# Patient Record
Sex: FEMALE | Race: WHITE
[De-identification: names, ages, dates, MRNs, and addresses within clinical notes are randomized per-mention and may not be internally consistent; named-entity substitution may affect disease eponyms.]

---

## 2020-03-23 ENCOUNTER — HOSPITAL ENCOUNTER (OUTPATIENT)
Dept: HOSPITAL 96 - M.RAD | Age: 55
End: 2020-03-23
Attending: ORTHOPAEDIC SURGERY
Payer: COMMERCIAL

## 2020-03-23 DIAGNOSIS — M25.561: Primary | ICD-10-CM

## 2020-03-27 ENCOUNTER — HOSPITAL ENCOUNTER (OUTPATIENT)
Dept: HOSPITAL 96 - M.MRI | Age: 55
End: 2020-03-27
Attending: ORTHOPAEDIC SURGERY
Payer: COMMERCIAL
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
## 2020-03-27 DIAGNOSIS — S83.281A: Primary | ICD-10-CM

## 2020-04-23 ENCOUNTER — HOSPITAL ENCOUNTER (OUTPATIENT)
Dept: HOSPITAL 96 - M.SUR | Age: 55
Discharge: HOME | End: 2020-04-23
Attending: ORTHOPAEDIC SURGERY
Payer: COMMERCIAL
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
## 2020-04-23 DIAGNOSIS — S83.511A: Primary | ICD-10-CM

## 2020-04-23 DIAGNOSIS — Z79.899: ICD-10-CM

## 2020-04-23 DIAGNOSIS — M94.261: ICD-10-CM
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
## 2020-04-23 DIAGNOSIS — S83.241A: ICD-10-CM

## 2020-04-23 DIAGNOSIS — Z98.890: ICD-10-CM

## 2020-05-04 NOTE — OP
[address]  [address]                    OPERATIVE REPORT              
_______________________________________________________________________________
 
Name:       AIMEE VICKERS                 Room:                      [facility]#:  [id number]      Account #:      [id number]  
Admission:  04/23/20     Attend Phys:    Chau Box II
Discharge:               Date of Birth:  [birth year]  
         Report #: [id number]
                                                                     [id number]  
_______________________________________________________________________________
THIS REPORT FOR:  //name//                      
 
cc:  Mirlande Gamez RN, Janene RN FNP                                             ~
THIS REPORT FOR:  //name//                      
 
CC: Mirlande Box
 
DATE OF SERVICE:  04/23/2020
 
 
PREOPERATIVE DIAGNOSIS:  Right knee anterior cruciate ligament tear.
 
POSTOPERATIVE DIAGNOSES:
1.  Right knee anterior cruciate ligament tear.
2.  Medial meniscus tear.
3.  Grade 3 chondromalacia, lateral femoral condyle.
4.  Grade 3 chondromalacia, medial femoral condyle.
 
PROCEDURE PERFORMED:
1.  Right knee arthroscopic surgery with ACL reconstruction with allograft.
2.  Abrasion chondroplasty of lateral femoral condyle down to bleeding bone.
3.  Partial medial meniscectomy.
4.  Abrasion chondroplasty of medial femoral condyle down to bleeding bone.
 
SURGEON:  Chau Box II, DO.
 
ASSISTANT:  JOE Hatch.
 
ANESTHESIA:  Per operative record.
 
ESTIMATED BLOOD LOSS:  Minimal.
 
ANTIBIOTICS:  Per operative record.
 
DRAINS:  None.
 
COMPLICATIONS:  None.
 
CONDITION OF THE PATIENT:  Stable to recovery room.
 
DESCRIPTION OF PROCEDURE:  The patient was taken to the operative suite and
placed supine on the operating table, given appropriate anesthesia.  The
patient's affected lower extremity was sterilely prepped and draped in a
well-padded knee arthroscopic koenig.  Surgery began by [address]  [address]                    OPERATIVE REPORT              
_______________________________________________________________________________
 
Name:       AIMEE VICKERS                 Room:                      [facility].#:  [id number]      Account #:      [id number]  
Admission:  04/23/20     Attend Phys:    Chau Box II
Discharge:               Date of Birth:  [birth year]  
         Report #: [id number]
                                                                     [id number]  
_______________________________________________________________________________
portal incisions.  The arthroscope was advanced in the joint.  There was shown
to be grade 3 chondromalacia to the lateral femoral condyle.  Utilizing a
shaver, an abrasion chondroplasty was performed down to bleeding bone and then
smoothed using Coblation wand in appropriate fashion.  The medial meniscus was
shown to have a tear along its medial margin to the posterior horn.  This was
debrided utilizing using baskets and shaver to resect the torn flap of tissue
from within the joint and then smoothed using Coblation wand.  The PCL was
intact.  The ACL did have a tear from its femoral attachment.  Allograft was
thawed and prepared in appropriate fashion.  The tibial tunnel was then sized,
prepared with the guide pin and retro-reamer to achieve the tibial tunnel for
appropriate alignment.  The over the top guide was then placed over the femur
and femoral tunnel was reamed.  Excess bone and cartilage debris was then
removed.  After the graft was appropriately placed over the Endobutton, it was
then transferred up into the knee and secured proximally and then tensioned in
situ with flexion and extension of the knee, secured distally with a tibial
screw.  This was probed and shown to be intact.  A medial femoral condyle also
had grade 3 chondromalacia and this was smoothed using Coblation wand down to
bleeding bone with the shaver and smoothed with Coblation wand.  ACL was probed
and shown to be intact.  Final images were obtained.  Final irrigation was then
performed.  The knee was then drained of arthroscopic fluid, closed with 4-0
nylon in simple interrupted fashion.  Dermabond and sterile dressing applied. 
The patient transported to recovery room in stable condition.  Counts were
correct throughout the procedure.
 
 
 
 
 
 
 
 
 
 
 
 
 
 
 
 
 
 
 
 
 
<ELECTRONICALLY SIGNED>
                                        By:  Chau Box II,      
05/04/20     1003
D: 04/24/20 1231_______________________________________
T: 04/24/20 1254Rsoto Box II DO        /nt

## 2020-05-06 ENCOUNTER — HOSPITAL ENCOUNTER (OUTPATIENT)
Dept: HOSPITAL 96 - M.RAD | Age: 55
End: 2020-05-06
Attending: ORTHOPAEDIC SURGERY
Payer: COMMERCIAL

## 2020-05-06 DIAGNOSIS — M19.012: ICD-10-CM

## 2020-05-06 DIAGNOSIS — M25.78: ICD-10-CM

## 2020-05-06 DIAGNOSIS — M40.50: ICD-10-CM

## 2020-05-06 DIAGNOSIS — M25.849: ICD-10-CM

## 2020-05-06 DIAGNOSIS — M19.041: ICD-10-CM

## 2020-05-06 DIAGNOSIS — M48.02: Primary | ICD-10-CM

## 2020-12-09 ENCOUNTER — HOSPITAL ENCOUNTER (OUTPATIENT)
Dept: HOSPITAL 35 - LAB | Age: 55
End: 2020-12-09
Attending: HOSPITALIST
Payer: COMMERCIAL

## 2020-12-09 DIAGNOSIS — Z20.828: Primary | ICD-10-CM

## 2021-03-22 ENCOUNTER — HOSPITAL ENCOUNTER (OUTPATIENT)
Dept: HOSPITAL 96 - M.RAD | Age: 56
End: 2021-03-22
Attending: ORTHOPAEDIC SURGERY
Payer: COMMERCIAL

## 2021-03-22 DIAGNOSIS — M17.11: Primary | ICD-10-CM

## 2021-03-22 DIAGNOSIS — M25.761: ICD-10-CM

## 2021-04-02 ENCOUNTER — HOSPITAL ENCOUNTER (OUTPATIENT)
Dept: HOSPITAL 96 - M.MRI | Age: 56
End: 2021-04-02
Attending: ORTHOPAEDIC SURGERY
Payer: COMMERCIAL

## 2021-04-02 DIAGNOSIS — M19.041: ICD-10-CM

## 2021-04-02 DIAGNOSIS — S63.681A: Primary | ICD-10-CM

## 2021-04-02 DIAGNOSIS — M18.11: ICD-10-CM
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
## 2022-02-25 ENCOUNTER — HOSPITAL ENCOUNTER (OUTPATIENT)
Dept: HOSPITAL 96 - M.LAB | Age: 57
End: 2022-02-25
Attending: NURSE PRACTITIONER
Payer: COMMERCIAL

## 2022-02-25 DIAGNOSIS — R53.83: ICD-10-CM

## 2022-02-25 DIAGNOSIS — Z00.01: Primary | ICD-10-CM

## 2022-02-25 DIAGNOSIS — R73.09: ICD-10-CM

## 2022-02-25 LAB
ALBUMIN SERPL-MCNC: 4.2 G/DL (ref 3.4–5)
ALP SERPL-CCNC: 98 U/L (ref 46–116)
ALT SERPL-CCNC: 34 U/L (ref 30–65)
ANION GAP SERPL CALC-SCNC: 2 MMOL/L (ref 7–16)
AST SERPL-CCNC: 20 U/L (ref 15–37)
BILIRUB SERPL-MCNC: 0.3 MG/DL
BUN SERPL-MCNC: 12 MG/DL (ref 7–18)
CALCIUM SERPL-MCNC: 9 MG/DL (ref 8.5–10.1)
CHLORIDE SERPL-SCNC: 104 MMOL/L (ref 98–107)
CHOLEST SERPL-MCNC: 224 MG/DL (ref ?–200)
CO2 SERPL-SCNC: 33 MMOL/L (ref 21–32)
CREAT SERPL-MCNC: 0.8 MG/DL (ref 0.6–1.3)
GLUCOSE SERPL-MCNC: 101 MG/DL (ref 70–99)
HCT VFR BLD CALC: 43.5 % (ref 37–47)
HDLC SERPL-MCNC: 77 MG/DL (ref 40–?)
HGB BLD-MCNC: 14.6 GM/DL (ref 12–15)
LDLC SERPL-MCNC: 140 MG/DL (ref ?–100)
MCH RBC QN AUTO: 30.2 PG (ref 26–34)
MCHC RBC AUTO-ENTMCNC: 33.7 G/DL (ref 28–37)
MCV RBC: 89.6 FL (ref 80–100)
MPV: 8.9 FL. (ref 7.2–11.1)
PLATELET COUNT*: 251 THOU/UL (ref 150–400)
POTASSIUM SERPL-SCNC: 4.6 MMOL/L (ref 3.5–5.1)
PROT SERPL-MCNC: 7.2 G/DL (ref 6.4–8.2)
RBC # BLD AUTO: 4.85 MIL/UL (ref 4.2–5)
RDW-CV: 13.1 % (ref 10.5–14.5)
SERUM ASSESSMENT: CLEAR
SODIUM SERPL-SCNC: 139 MMOL/L (ref 136–145)
TC:HDL: 2.9 RATIO
TRIGL SERPL-MCNC: 37 MG/DL (ref ?–150)
VLDLC SERPL CALC-MCNC: 7 MG/DL (ref ?–40)
WBC # BLD AUTO: 5.6 THOU/UL (ref 4–11)